# Patient Record
Sex: FEMALE | Race: WHITE | NOT HISPANIC OR LATINO | Employment: UNEMPLOYED | URBAN - METROPOLITAN AREA
[De-identification: names, ages, dates, MRNs, and addresses within clinical notes are randomized per-mention and may not be internally consistent; named-entity substitution may affect disease eponyms.]

---

## 2019-07-11 ENCOUNTER — OFFICE VISIT (OUTPATIENT)
Dept: URGENT CARE | Facility: CLINIC | Age: 24
End: 2019-07-11
Payer: OTHER GOVERNMENT

## 2019-07-11 VITALS
SYSTOLIC BLOOD PRESSURE: 110 MMHG | DIASTOLIC BLOOD PRESSURE: 70 MMHG | WEIGHT: 129 LBS | TEMPERATURE: 98.9 F | BODY MASS INDEX: 23.74 KG/M2 | OXYGEN SATURATION: 98 % | HEIGHT: 62 IN | RESPIRATION RATE: 16 BRPM | HEART RATE: 98 BPM

## 2019-07-11 DIAGNOSIS — L08.9 SOFT TISSUE INFECTION: ICD-10-CM

## 2019-07-11 PROCEDURE — 99203 OFFICE O/P NEW LOW 30 MIN: CPT | Performed by: FAMILY MEDICINE

## 2019-07-11 RX ORDER — SULFAMETHOXAZOLE AND TRIMETHOPRIM 800; 160 MG/1; MG/1
1 TABLET ORAL EVERY 12 HOURS
Qty: 20 TAB | Refills: 0 | Status: SHIPPED | OUTPATIENT
Start: 2019-07-11 | End: 2019-07-21

## 2019-07-11 ASSESSMENT — ENCOUNTER SYMPTOMS
EYE DISCHARGE: 0
SENSORY CHANGE: 0
CHILLS: 0
FEVER: 0
EYE REDNESS: 0
FOCAL WEAKNESS: 0

## 2019-07-11 NOTE — PROGRESS NOTES
"Subjective:      Mikala Fernández is a 23 y.o. female who presents with Nodule (x 1 wk, Bump on Rt. upper leg, another one on Rt. armpit and another one under Lt. arm.  Headaches and nausea. )            1 week multiple red painful bumps right groin and bilateral axilla. No drainage. No fever. +nausea and fatigue. Doesn't suspect pregnancy, just finished normal menses. No PMH same. No OTC or home remedies. No other aggravating or alleviating factors.          Review of Systems   Constitutional: Negative for chills and fever.   Eyes: Negative for discharge and redness.   Skin: Negative for itching and rash.   Neurological: Negative for sensory change and focal weakness.   .  Medications, Allergies, and current problem list reviewed today in Epic         Objective:     /70 (BP Location: Left arm, Patient Position: Sitting, BP Cuff Size: Adult)   Pulse 98   Temp 37.2 °C (98.9 °F) (Temporal)   Resp 16   Ht 1.575 m (5' 2\")   Wt 58.5 kg (129 lb)   SpO2 98%   BMI 23.59 kg/m²      Physical Exam   Constitutional: She is oriented to person, place, and time. She appears well-developed and well-nourished.   HENT:   Head: Normocephalic and atraumatic.   Eyes: Conjunctivae are normal.   Cardiovascular: Normal rate, regular rhythm and normal heart sounds.    Pulmonary/Chest: Effort normal and breath sounds normal.   Lymphadenopathy:     She has no cervical adenopathy.   Neurological: She is alert and oriented to person, place, and time.   Skin: Skin is warm and dry.   Red tender and circumscribed masses bilateral axilla and right-sided groin.  No fluctuance.  No expanding redness.  No lymphangitis.  No obvious lymphadenopathy.  No other aggravating or alleviating factors.               Assessment/Plan:     1. Soft tissue infection  sulfamethoxazole-trimethoprim (BACTRIM DS) 800-160 MG tablet     Differential diagnosis, natural history, supportive care, and indications for immediate follow-up discussed at length. "     Nothing to clearly drain today.  She understands to come back if these come to ahead.  Of note the groin swelling is less red but does still appear to be cutaneous.    Differential diagnosis includes hidradenitis suppurativa.  If recurrent would recommend dermatology referral.

## 2019-07-17 ENCOUNTER — OFFICE VISIT (OUTPATIENT)
Dept: URGENT CARE | Facility: PHYSICIAN GROUP | Age: 24
End: 2019-07-17
Payer: OTHER GOVERNMENT

## 2019-07-17 VITALS
TEMPERATURE: 99.7 F | OXYGEN SATURATION: 98 % | HEIGHT: 62 IN | SYSTOLIC BLOOD PRESSURE: 108 MMHG | WEIGHT: 130 LBS | BODY MASS INDEX: 23.92 KG/M2 | HEART RATE: 103 BPM | DIASTOLIC BLOOD PRESSURE: 62 MMHG

## 2019-07-17 DIAGNOSIS — S61.411A LACERATION OF RIGHT HAND, INITIAL ENCOUNTER: Primary | ICD-10-CM

## 2019-07-17 PROCEDURE — 12001 RPR S/N/AX/GEN/TRNK 2.5CM/<: CPT | Performed by: NURSE PRACTITIONER

## 2019-07-17 ASSESSMENT — ENCOUNTER SYMPTOMS
CONSTITUTIONAL NEGATIVE: 1
SENSORY CHANGE: 1
FOCAL WEAKNESS: 0
RESPIRATORY NEGATIVE: 1

## 2019-07-18 ENCOUNTER — OFFICE VISIT (OUTPATIENT)
Dept: URGENT CARE | Facility: CLINIC | Age: 24
End: 2019-07-18
Payer: OTHER GOVERNMENT

## 2019-07-18 VITALS
OXYGEN SATURATION: 98 % | HEIGHT: 62 IN | HEART RATE: 70 BPM | TEMPERATURE: 98.7 F | RESPIRATION RATE: 16 BRPM | DIASTOLIC BLOOD PRESSURE: 68 MMHG | WEIGHT: 130 LBS | SYSTOLIC BLOOD PRESSURE: 106 MMHG | BODY MASS INDEX: 23.92 KG/M2

## 2019-07-18 DIAGNOSIS — S61.210D LACERATION OF RIGHT INDEX FINGER WITHOUT FOREIGN BODY WITHOUT DAMAGE TO NAIL, SUBSEQUENT ENCOUNTER: ICD-10-CM

## 2019-07-18 DIAGNOSIS — S61.401A EXTENSOR TENDON LACERATION OF HAND WITH OPEN WOUND, RIGHT, INITIAL ENCOUNTER: ICD-10-CM

## 2019-07-18 DIAGNOSIS — S66.821A EXTENSOR TENDON LACERATION OF HAND WITH OPEN WOUND, RIGHT, INITIAL ENCOUNTER: ICD-10-CM

## 2019-07-18 PROCEDURE — 99024 POSTOP FOLLOW-UP VISIT: CPT | Performed by: FAMILY MEDICINE

## 2019-07-18 RX ORDER — HYDROCODONE BITARTRATE AND ACETAMINOPHEN 5; 325 MG/1; MG/1
1-2 TABLET ORAL EVERY 8 HOURS PRN
Qty: 10 TAB | Refills: 0 | Status: SHIPPED | OUTPATIENT
Start: 2019-07-18 | End: 2019-07-23

## 2019-07-18 NOTE — PROGRESS NOTES
"Subjective:     Mikala Huitron is a 23 y.o. female who presents for Trauma (Right hand - Glass bowl broke and slit right pointer knuckle. )       Laceration    Her tetanus status is UTD.     Pt reports that about 1 hour PTA she was cleaning dishes when a glass bowl broke and accidentally cut the skin above her knuckles of her right hand. She has a laceration at the dorsal base of her R 2nd and 3rd fingers. Reports some decreased sensation, pain, swelling, and decreased ROM. Pt reports last Tdap 2 years ago.    PMH:  has no past medical history on file.    MEDS:   Current Outpatient Prescriptions:   •  sulfamethoxazole-trimethoprim (BACTRIM DS) 800-160 MG tablet, Take 1 Tab by mouth every 12 hours for 10 days., Disp: 20 Tab, Rfl: 0    ALLERGIES:   Allergies   Allergen Reactions   • Methimazole Swelling     Throat swalling     SURGHX: No past surgical history on file.    SOCHX:  reports that she has never smoked. She has never used smokeless tobacco. She reports that she does not drink alcohol or use drugs.     FH: Reviewed with patient, not pertinent to this visit.    Review of Systems   Constitutional: Negative.    Respiratory: Negative.    Musculoskeletal:        Pain, swelling, decreased ROM of 2nd and 3rd fingers of R hand   Skin:        Laceration of R hand at knuckles of 2nd and 3rd fingers   Neurological: Positive for sensory change. Negative for focal weakness.   All other systems reviewed and are negative.    Objective:     /62 (BP Location: Left arm, Patient Position: Sitting, BP Cuff Size: Adult)   Pulse (!) 103   Temp 37.6 °C (99.7 °F) (Temporal)   Ht 1.575 m (5' 2\")   Wt 59 kg (130 lb)   SpO2 98%   BMI 23.78 kg/m²     Physical Exam   Constitutional: She is oriented to person, place, and time. She appears well-developed and well-nourished.  Non-toxic appearance. No distress.   HENT:   Right Ear: External ear normal.   Left Ear: External ear normal.   Nose: Nose normal.   Eyes: Conjunctivae " and EOM are normal.   Neck: Normal range of motion.   Cardiovascular: Normal pulses.  Tachycardia present.    Pulmonary/Chest: Effort normal. No respiratory distress.   Musculoskeletal: She exhibits no deformity.        Right hand: She exhibits decreased range of motion, laceration and swelling. She exhibits normal capillary refill and no deformity. Decreased sensation noted.        Hands:  Neurological: She is alert and oriented to person, place, and time. Coordination normal.   Skin: Skin is warm and dry. Capillary refill takes less than 2 seconds.   Psychiatric: She has a normal mood and affect. Her behavior is normal.        Assessment/Plan:     1. Laceration of left hand, initial encounter    Procedure: Laceration Repair of Right Hand  - Risks, benefits, and alternatives reviewed. Risks including bleeding, nerve damage, infection, and poor cosmetic outcome discussed at length.  - Verbal consent was received from patient to proceed with laceration repair.  - Wound length 2.5 cm, location left hand, straight laceration, subcutaneous tissue visible, NVI, no signs of tendon injury.  - Area copiously irrigated with NS, wound explored, no foreign bodies identified.  - Site prepared with Betadine.  - Sterile technique throughout.  - Local anesthesia achieved with 1.5 mL of 2% lidocaine without epinephrine.  - Applied 7 sutures with 4.0 Ethilon interrupted sutures with good wound edge approximation.  - Irrigated copiously with NS.  - Minimal bleeding with good hemostasis achieved.   - Antibiotic ointment and non-adhesive dressing applied.  - There were no procedural complications.  - Patient tolerated procedure well.    - Last tetanus booster was 2 years ago.     Patient advised to monitor for signs of worsening infection including, but not limited to, increased redness, warmth, pain, swelling, discharge, or fever. Wound care instructions discussed. Advised to protect from further injury. May apply ice packs, elevate,  and take OTC ibuprofen and/or acetaminophen for pain. Return for suture removal in 10 days.    Patient currently on Bactrim DS BID for 5 more days for pre-existing soft tissue infection.    Finger splint applied to protect laceration, but patient encouraged to occasionally remove splint and exercise gentle ROM.    Patient advised to: Return for 1) Symptoms don't improve or worsen, or go to ER.    Differential diagnosis, natural history, supportive care, and indications for immediate follow-up discussed. All questions answered. Patient agrees with the plan of care.

## 2019-07-18 NOTE — PATIENT INSTRUCTIONS
Laceration Care, Adult  A laceration is a cut that goes through all of the layers of the skin and into the tissue that is right under the skin. Some lacerations heal on their own. Others need to be closed with stitches (sutures), staples, skin adhesive strips, or skin glue. Proper laceration care minimizes the risk of infection and helps the laceration to heal better.  HOW TO CARE FOR YOUR LACERATION  If sutures or staples were used:  · Keep the wound clean and dry.  · If you were given a bandage (dressing), you should change it at least one time per day or as told by your health care provider. You should also change it if it becomes wet or dirty.  · Keep the wound completely dry for the first 24 hours or as told by your health care provider. After that time, you may shower or bathe. However, make sure that the wound is not soaked in water until after the sutures or staples have been removed.  · Clean the wound one time each day or as told by your health care provider:  ¨ Wash the wound with soap and water.  ¨ Rinse the wound with water to remove all soap.  ¨ Pat the wound dry with a clean towel. Do not rub the wound.  · After cleaning the wound, apply a thin layer of antibiotic ointment as told by your health care provider. This will help to prevent infection and keep the dressing from sticking to the wound.  · Have the sutures or staples removed as told by your health care provider.  If skin adhesive strips were used:  · Keep the wound clean and dry.  · If you were given a bandage (dressing), you should change it at least one time per day or as told by your health care provider. You should also change it if it becomes dirty or wet.  · Do not get the skin adhesive strips wet. You may shower or bathe, but be careful to keep the wound dry.  · If the wound gets wet, pat it dry with a clean towel. Do not rub the wound.  · Skin adhesive strips fall off on their own. You may trim the strips as the wound heals. Do not  remove skin adhesive strips that are still stuck to the wound. They will fall off in time.  If skin glue was used:  · Try to keep the wound dry, but you may briefly wet it in the shower or bath. Do not soak the wound in water, such as by swimming.  · After you have showered or bathed, gently pat the wound dry with a clean towel. Do not rub the wound.  · Do not do any activities that will make you sweat heavily until the skin glue has fallen off on its own.  · Do not apply liquid, cream, or ointment medicine to the wound while the skin glue is in place. Using those may loosen the film before the wound has healed.  · If you were given a bandage (dressing), you should change it at least one time per day or as told by your health care provider. You should also change it if it becomes dirty or wet.  · If a dressing is placed over the wound, be careful not to apply tape directly over the skin glue. Doing that may cause the glue to be pulled off before the wound has healed.  · Do not pick at the glue. The skin glue usually remains in place for 5-10 days, then it falls off of the skin.  General Instructions  · Take over-the-counter and prescription medicines only as told by your health care provider.  · If you were prescribed an antibiotic medicine or ointment, take or apply it as told by your doctor. Do not stop using it even if your condition improves.  · To help prevent scarring, make sure to cover your wound with sunscreen whenever you are outside after stitches are removed, after adhesive strips are removed, or when glue remains in place and the wound is healed. Make sure to wear a sunscreen of at least 30 SPF.  · Do not scratch or pick at the wound.  · Keep all follow-up visits as told by your health care provider. This is important.  · Check your wound every day for signs of infection. Watch for:  ¨ Redness, swelling, or pain.  ¨ Fluid, blood, or pus.  · Raise (elevate) the injured area above the level of your heart  while you are sitting or lying down, if possible.  SEEK MEDICAL CARE IF:  · You received a tetanus shot and you have swelling, severe pain, redness, or bleeding at the injection site.  · You have a fever.  · A wound that was closed breaks open.  · You notice a bad smell coming from your wound or your dressing.  · You notice something coming out of the wound, such as wood or glass.  · Your pain is not controlled with medicine.  · You have increased redness, swelling, or pain at the site of your wound.  · You have fluid, blood, or pus coming from your wound.  · You notice a change in the color of your skin near your wound.  · You need to change the dressing frequently due to fluid, blood, or pus draining from the wound.  · You develop a new rash.  · You develop numbness around the wound.  SEEK IMMEDIATE MEDICAL CARE IF:  · You develop severe swelling around the wound.  · Your pain suddenly increases and is severe.  · You develop painful lumps near the wound or on skin that is anywhere on your body.  · You have a red streak going away from your wound.  · The wound is on your hand or foot and you cannot properly move a finger or toe.  · The wound is on your hand or foot and you notice that your fingers or toes look pale or bluish.     This information is not intended to replace advice given to you by your health care provider. Make sure you discuss any questions you have with your health care provider.     Document Released: 12/18/2006 Document Revised: 05/03/2016 Document Reviewed: 12/14/2015  [employer] Interactive Patient Education ©2016 [employer]

## 2019-07-19 NOTE — PROGRESS NOTES
Patient seen last night for laceration over the dorsal aspect of her second right MCP joint.  She is noticed significant swelling and bruising in the finger.  She is unable to actively extend her finger.  Pain was severe last night and not controlled with over-the-counter medication.  She is right-hand dominant      Right hand: Linear laceration dorsum second MCP joint is well approximated, clean dry and intact.  She is unable to extend her finger.    A/P    1. Extensor tendon laceration of hand with open wound, right, initial encounter  REFERRAL TO HAND SURGERY    HYDROcodone-acetaminophen (NORCO) 5-325 MG Tab per tablet    Consent for Opiate Prescription   2. Laceration of right index finger without foreign body without damage to nail, subsequent encounter  Consent for Opiate Prescription     Differential diagnosis, natural history, supportive care, and indications for immediate follow-up discussed at length.     Referral to hand surgery has been placed.  I will call in the morning to make sure she gets follow-up.

## 2019-07-22 ENCOUNTER — HOSPITAL ENCOUNTER (EMERGENCY)
Facility: MEDICAL CENTER | Age: 24
End: 2019-07-22
Attending: EMERGENCY MEDICINE
Payer: OTHER GOVERNMENT

## 2019-07-22 VITALS
OXYGEN SATURATION: 98 % | BODY MASS INDEX: 23.57 KG/M2 | HEIGHT: 62 IN | RESPIRATION RATE: 16 BRPM | HEART RATE: 89 BPM | WEIGHT: 128.09 LBS | DIASTOLIC BLOOD PRESSURE: 74 MMHG | SYSTOLIC BLOOD PRESSURE: 110 MMHG | TEMPERATURE: 98.3 F

## 2019-07-22 DIAGNOSIS — L27.0 DRUG RASH: ICD-10-CM

## 2019-07-22 PROCEDURE — 700102 HCHG RX REV CODE 250 W/ 637 OVERRIDE(OP): Performed by: EMERGENCY MEDICINE

## 2019-07-22 PROCEDURE — 99283 EMERGENCY DEPT VISIT LOW MDM: CPT

## 2019-07-22 PROCEDURE — A9270 NON-COVERED ITEM OR SERVICE: HCPCS | Performed by: EMERGENCY MEDICINE

## 2019-07-22 RX ORDER — METHYLPREDNISOLONE 4 MG/1
TABLET ORAL
Qty: 1 KIT | Refills: 0 | Status: SHIPPED | OUTPATIENT
Start: 2019-07-22 | End: 2020-11-09

## 2019-07-22 RX ORDER — DIPHENHYDRAMINE HCL 25 MG
25 TABLET ORAL ONCE
Status: COMPLETED | OUTPATIENT
Start: 2019-07-22 | End: 2019-07-22

## 2019-07-22 RX ORDER — DEXAMETHASONE 4 MG/1
8 TABLET ORAL ONCE
Status: COMPLETED | OUTPATIENT
Start: 2019-07-22 | End: 2019-07-22

## 2019-07-22 RX ADMIN — DIPHENHYDRAMINE HCL 25 MG: 25 TABLET ORAL at 09:55

## 2019-07-22 RX ADMIN — DEXAMETHASONE 8 MG: 4 TABLET ORAL at 09:55

## 2019-07-22 NOTE — ED TRIAGE NOTES
Chief Complaint   Patient presents with   • Hives     Hives over trunk, arms, and legs.  States has mild difficulty swallowing.  Managing secretions.  No SOB.  Pt recently started taking bactrim.  Triage process explained to patient.  Pt back to waiting room.  Pt instructed to inform RN if any changes or questions arise.

## 2019-07-22 NOTE — ED PROVIDER NOTES
"ED Provider Note    CHIEF COMPLAINT  Chief Complaint   Patient presents with   • Hives       HPI  Mikala Huitron is a 23 y.o. female who presents for evaluation of 3 to 4 days of red raised pruritic rash.  The patient was recently prescribed Bactrim for an apparent lymphadenitis but then also had a tendon injury to the finger.  She is around 7 to 10 days and course.  She has no known history of drug or food allergies.  She denies any significant trouble breathing but reports a tickle in the back of her throat.  No other symptoms such as night sweats weight loss.  She denies pregnancy.  No other symptoms reported    REVIEW OF SYSTEMS  See HPI for further details.  No night sweats weight loss numbness tingling weakness rash all other systems are negative.     PAST MEDICAL HISTORY  No past medical history on file.  None reported  FAMILY HISTORY  Noncontributory    SOCIAL HISTORY  Social History     Social History   • Marital status: Single     Spouse name: N/A   • Number of children: N/A   • Years of education: N/A     Social History Main Topics   • Smoking status: Never Smoker   • Smokeless tobacco: Never Used   • Alcohol use No   • Drug use: No   • Sexual activity: Not on file     Other Topics Concern   • Not on file     Social History Narrative   • No narrative on file     Denies IV drug  SURGICAL HISTORY  No past surgical history on file.  No major surgery  CURRENT MEDICATIONS  No current facility-administered medications for this encounter.     Current Outpatient Prescriptions:   •  HYDROcodone-acetaminophen (NORCO) 5-325 MG Tab per tablet, Take 1-2 Tabs by mouth every 8 hours as needed for up to 5 days., Disp: 10 Tab, Rfl: 0      ALLERGIES  Allergies   Allergen Reactions   • Methimazole Swelling     Throat swalling       PHYSICAL EXAM  VITAL SIGNS: /70   Pulse (!) 105   Temp 36.8 °C (98.3 °F) (Temporal)   Resp 14   Ht 1.575 m (5' 2\")   Wt 58.1 kg (128 lb 1.4 oz)   SpO2 97%   BMI 23.43 kg/m²   "     Constitutional: Well developed, Well nourished, No acute distress, Non-toxic appearance.   HENT: Normocephalic, Atraumatic, Bilateral external ears normal, Oropharynx moist, No oral exudates, Nose normal.   Eyes: PERRLA, EOMI, Conjunctiva normal, No discharge.   Neck: Normal range of motion, No tenderness, Supple, No stridor.   Cardiovascular: Normal heart rate, Normal rhythm, No murmurs, No rubs, No gallops.   Thorax & Lungs: Normal breath sounds, No respiratory distress, No wheezing, No chest tenderness.   Abdomen: Bowel sounds normal, Soft, No tenderness, No masses, No pulsatile masses.   Skin: Some red raised blanchable rash, discrete papules no petechiae no purpura  Back: No tenderness, No CVA tenderness.   Extremities: Intact distal pulses, No edema, No tenderness, No cyanosis, No clubbing.   Musculoskeletal: Left hand exam is notable for transverse laceration on the dorsal aspect of the left index finger with a healing surgical wound   neurologic: Alert & oriented x 3, Normal motor function, Normal sensory function, No focal deficits noted.   Psychiatric: Anxious      COURSE & MEDICAL DECISION MAKING  Pertinent Labs & Imaging studies reviewed. (See chart for details)  Patient was given oral Benadryl and Decadron.  Her rash was improved.  This is most consistent with a Bactrim drug rash rather than anaphylaxis or true hives.  She is Ponce done with her antibiotic therapy and there is no clear indicati better gets around on for continued use.  I counseled her that she likely is allergic to sulfa-based antibiotics.  I will place her on a Medrol Dosepak and she also needs referral to hand surgery for ongoing management of tendon injury    FINAL IMPRESSION  1.   1. Drug rash             Electronically signed by: Reji Trotter, 7/22/2019 9:46 AM

## 2019-12-30 ENCOUNTER — APPOINTMENT (OUTPATIENT)
Dept: RADIOLOGY | Facility: MEDICAL CENTER | Age: 24
End: 2019-12-30
Attending: EMERGENCY MEDICINE
Payer: OTHER GOVERNMENT

## 2019-12-30 ENCOUNTER — HOSPITAL ENCOUNTER (EMERGENCY)
Facility: MEDICAL CENTER | Age: 24
End: 2019-12-30
Attending: EMERGENCY MEDICINE
Payer: OTHER GOVERNMENT

## 2019-12-30 VITALS
BODY MASS INDEX: 22.96 KG/M2 | HEIGHT: 62 IN | HEART RATE: 90 BPM | OXYGEN SATURATION: 99 % | TEMPERATURE: 98 F | WEIGHT: 124.78 LBS | DIASTOLIC BLOOD PRESSURE: 57 MMHG | RESPIRATION RATE: 18 BRPM | SYSTOLIC BLOOD PRESSURE: 97 MMHG

## 2019-12-30 DIAGNOSIS — K52.9 COLITIS: ICD-10-CM

## 2019-12-30 DIAGNOSIS — R10.9 ABDOMINAL PAIN, UNSPECIFIED ABDOMINAL LOCATION: ICD-10-CM

## 2019-12-30 LAB
ALBUMIN SERPL BCP-MCNC: 4.2 G/DL (ref 3.2–4.9)
ALBUMIN/GLOB SERPL: 1.6 G/DL
ALP SERPL-CCNC: 37 U/L (ref 30–99)
ALT SERPL-CCNC: 13 U/L (ref 2–50)
ANION GAP SERPL CALC-SCNC: 8 MMOL/L (ref 0–11.9)
APPEARANCE UR: CLEAR
AST SERPL-CCNC: 18 U/L (ref 12–45)
BACTERIA #/AREA URNS HPF: ABNORMAL /HPF
BASOPHILS # BLD AUTO: 0.4 % (ref 0–1.8)
BASOPHILS # BLD: 0.02 K/UL (ref 0–0.12)
BILIRUB SERPL-MCNC: 0.7 MG/DL (ref 0.1–1.5)
BILIRUB UR QL STRIP.AUTO: NEGATIVE
BUN SERPL-MCNC: 13 MG/DL (ref 8–22)
CALCIUM SERPL-MCNC: 9 MG/DL (ref 8.5–10.5)
CHLORIDE SERPL-SCNC: 106 MMOL/L (ref 96–112)
CO2 SERPL-SCNC: 25 MMOL/L (ref 20–33)
COLOR UR: YELLOW
CREAT SERPL-MCNC: 0.68 MG/DL (ref 0.5–1.4)
EOSINOPHIL # BLD AUTO: 0.1 K/UL (ref 0–0.51)
EOSINOPHIL NFR BLD: 1.8 % (ref 0–6.9)
EPI CELLS #/AREA URNS HPF: ABNORMAL /HPF
ERYTHROCYTE [DISTWIDTH] IN BLOOD BY AUTOMATED COUNT: 42.1 FL (ref 35.9–50)
GLOBULIN SER CALC-MCNC: 2.7 G/DL (ref 1.9–3.5)
GLUCOSE SERPL-MCNC: 79 MG/DL (ref 65–99)
GLUCOSE UR STRIP.AUTO-MCNC: NEGATIVE MG/DL
HCG SERPL QL: NEGATIVE
HCT VFR BLD AUTO: 41.1 % (ref 37–47)
HGB BLD-MCNC: 13.3 G/DL (ref 12–16)
HYALINE CASTS #/AREA URNS LPF: ABNORMAL /LPF
IMM GRANULOCYTES # BLD AUTO: 0.02 K/UL (ref 0–0.11)
IMM GRANULOCYTES NFR BLD AUTO: 0.4 % (ref 0–0.9)
KETONES UR STRIP.AUTO-MCNC: NEGATIVE MG/DL
LEUKOCYTE ESTERASE UR QL STRIP.AUTO: ABNORMAL
LYMPHOCYTES # BLD AUTO: 2.03 K/UL (ref 1–4.8)
LYMPHOCYTES NFR BLD: 35.6 % (ref 22–41)
MCH RBC QN AUTO: 31 PG (ref 27–33)
MCHC RBC AUTO-ENTMCNC: 32.4 G/DL (ref 33.6–35)
MCV RBC AUTO: 95.8 FL (ref 81.4–97.8)
MICRO URNS: ABNORMAL
MONOCYTES # BLD AUTO: 0.48 K/UL (ref 0–0.85)
MONOCYTES NFR BLD AUTO: 8.4 % (ref 0–13.4)
NEUTROPHILS # BLD AUTO: 3.05 K/UL (ref 2–7.15)
NEUTROPHILS NFR BLD: 53.4 % (ref 44–72)
NITRITE UR QL STRIP.AUTO: NEGATIVE
NRBC # BLD AUTO: 0 K/UL
NRBC BLD-RTO: 0 /100 WBC
PH UR STRIP.AUTO: 5 [PH] (ref 5–8)
PLATELET # BLD AUTO: 229 K/UL (ref 164–446)
PMV BLD AUTO: 9.5 FL (ref 9–12.9)
POTASSIUM SERPL-SCNC: 3.9 MMOL/L (ref 3.6–5.5)
PROT SERPL-MCNC: 6.9 G/DL (ref 6–8.2)
PROT UR QL STRIP: NEGATIVE MG/DL
RBC # BLD AUTO: 4.29 M/UL (ref 4.2–5.4)
RBC # URNS HPF: ABNORMAL /HPF
RBC UR QL AUTO: NEGATIVE
SODIUM SERPL-SCNC: 139 MMOL/L (ref 135–145)
SP GR UR STRIP.AUTO: 1.02
UROBILINOGEN UR STRIP.AUTO-MCNC: 0.2 MG/DL
WBC # BLD AUTO: 5.7 K/UL (ref 4.8–10.8)
WBC #/AREA URNS HPF: ABNORMAL /HPF

## 2019-12-30 PROCEDURE — 74177 CT ABD & PELVIS W/CONTRAST: CPT

## 2019-12-30 PROCEDURE — 85025 COMPLETE CBC W/AUTO DIFF WBC: CPT

## 2019-12-30 PROCEDURE — 80053 COMPREHEN METABOLIC PANEL: CPT

## 2019-12-30 PROCEDURE — 96375 TX/PRO/DX INJ NEW DRUG ADDON: CPT

## 2019-12-30 PROCEDURE — 94760 N-INVAS EAR/PLS OXIMETRY 1: CPT

## 2019-12-30 PROCEDURE — 700117 HCHG RX CONTRAST REV CODE 255: Performed by: EMERGENCY MEDICINE

## 2019-12-30 PROCEDURE — 84703 CHORIONIC GONADOTROPIN ASSAY: CPT

## 2019-12-30 PROCEDURE — 96374 THER/PROPH/DIAG INJ IV PUSH: CPT

## 2019-12-30 PROCEDURE — 700111 HCHG RX REV CODE 636 W/ 250 OVERRIDE (IP): Performed by: EMERGENCY MEDICINE

## 2019-12-30 PROCEDURE — 81001 URINALYSIS AUTO W/SCOPE: CPT

## 2019-12-30 PROCEDURE — 87086 URINE CULTURE/COLONY COUNT: CPT

## 2019-12-30 PROCEDURE — 99285 EMERGENCY DEPT VISIT HI MDM: CPT

## 2019-12-30 RX ORDER — AMOXICILLIN AND CLAVULANATE POTASSIUM 875; 125 MG/1; MG/1
1 TABLET, FILM COATED ORAL 2 TIMES DAILY
Qty: 20 TAB | Refills: 0 | Status: SHIPPED | OUTPATIENT
Start: 2019-12-30 | End: 2020-01-09

## 2019-12-30 RX ORDER — ONDANSETRON 2 MG/ML
4 INJECTION INTRAMUSCULAR; INTRAVENOUS ONCE
Status: COMPLETED | OUTPATIENT
Start: 2019-12-30 | End: 2019-12-30

## 2019-12-30 RX ORDER — KETOROLAC TROMETHAMINE 30 MG/ML
30 INJECTION, SOLUTION INTRAMUSCULAR; INTRAVENOUS ONCE
Status: COMPLETED | OUTPATIENT
Start: 2019-12-30 | End: 2019-12-30

## 2019-12-30 RX ADMIN — IOHEXOL 100 ML: 350 INJECTION, SOLUTION INTRAVENOUS at 13:22

## 2019-12-30 RX ADMIN — KETOROLAC TROMETHAMINE 30 MG: 30 INJECTION, SOLUTION INTRAMUSCULAR; INTRAVENOUS at 12:31

## 2019-12-30 RX ADMIN — ONDANSETRON 4 MG: 2 INJECTION INTRAMUSCULAR; INTRAVENOUS at 12:31

## 2019-12-30 NOTE — ED TRIAGE NOTES
Chief Complaint   Patient presents with   • Abdominal Cramping     all day yesterday. today having stabbing pain in rlq and radiates to back.   • Diarrhea     yesterday had 6 episodes.   • Urinary Frequency     since yesterday. and feels like having uti symptoms.     States already had her right fallopian tube removed. Educated on triage process.instructed to notify staff for any worsening symptoms. NAD.

## 2019-12-30 NOTE — ED NOTES
Pt has been updated on poc.  Pain has improved.  3/10     advised patient to return to ED or call 911 if he develops suicidal or homicidal ideation.

## 2019-12-30 NOTE — DISCHARGE INSTRUCTIONS
Follow-up with primary care as well as your GI physician when you return to [place] for reevaluation and further work-up if indicated or for persistent symptoms.    Augmentin twice daily for 10 days.    Encourage oral fluid hydration.  Clear liquid diet for 48 hours, then advance to bland foods as tolerated.    Return to the emergency department for persistent or worsening abdominal pain, fever, vomiting, bloody stools or other new concerns.

## 2019-12-30 NOTE — ED PROVIDER NOTES
ED Provider Note    CHIEF COMPLAINT  Chief Complaint   Patient presents with   • Abdominal Cramping     all day yesterday. today having stabbing pain in rlq and radiates to back.   • Diarrhea     yesterday had 6 episodes.   • Urinary Frequency     since yesterday. and feels like having uti symptoms.       HPI  Mikala Huitron is a 24 y.o. female who presents to the emergency department through triage for abdominal pain.  Patient describes some generalized abdominal cramping yesterday, now more in the lower abdomen, right lower quadrant.  Persistent for 24 hours, currently 5 out of 10 constant, aching, cramping discomfort.  No nausea or vomiting.  Patient did have 6 episodes of nonbloody or mucoid diarrhea yesterday however none today.  Tolerating food and fluids without difficulty but did not eat or drink this morning for recommendations from advice nurse before presenting to the emergency department.  Patient denies dysuria, hematuria urgency.  Patient describes some urinary frequency, but states she is been drinking more water.  Midcycle, denies similar symptoms with premenstrual time or ovulation previously.  Denies pregnancy.  Denies abnormal vaginal discharge or concern for STD.  Patient is ,  is deployed.    History of ectopic pregnancy with salpingectomy 1 year ago.  History of ovarian cyst, although small and believe that that had resolved.    REVIEW OF SYSTEMS  See HPI for further details. All other systems are negative.     PAST MEDICAL HISTORY   has a past medical history of Ectopic pregnancy.    SOCIAL HISTORY  Social History     Tobacco Use   • Smoking status: Never Smoker   • Smokeless tobacco: Never Used   Substance and Sexual Activity   • Alcohol use: No   • Drug use: No   • Sexual activity: Not on file       SURGICAL HISTORY  patient denies any surgical history    CURRENT MEDICATIONS  Home Medications     Reviewed by Shelia Carrion R.N. (Registered Nurse) on 12/30/19 at [place]  [facility]  "Status: Complete   Medication Last Dose Status   methylPREDNISolone (MEDROL DOSEPAK) 4 MG Tablet Therapy Pack not taking Active                ALLERGIES  Allergies   Allergen Reactions   • Methimazole Swelling     Throat swalling       PHYSICAL EXAM  VITAL SIGNS: BP (!) 97/57   Pulse 90   Temp 36.7 °C (98 °F) (Oral)   Resp 18   Ht 1.575 m (5' 2\")   Wt 56.6 kg (124 lb 12.5 oz)   LMP 12/14/2019   SpO2 99%   Breastfeeding? Unknown   BMI 22.82 kg/m²   Pulse ox interpretation: I interpret this pulse ox as normal.  Constitutional: Alert in no apparent distress.  HENT: Normocephalic, atraumatic. Bilateral external ears normal, Nose normal. Moist mucous membranes.    Eyes: Pupils are equal and reactive, Conjunctiva normal.   Neck: Normal range of motion, Supple  Lymphatic: No lymphadenopathy noted.   Cardiovascular: Regular rate and rhythm, no murmurs. Distal pulses intact.    Thorax & Lungs: Normal breath sounds.  No wheezing/rales/ronchi. No increased work of breathing  Abdomen: Soft, non-distended.  Minimal reproducible discomfort in the suprapubic and right lower quadrant.  No localized McBurney tenderness.  No guarding or peritonitis.  No CVA tenderness percussion.  Skin: Warm, Dry, No erythema, No rash.   Musculoskeletal: Good range of motion in all major joints.  Neurologic: Alert and oriented x4.  Ambulates independently.  Psychiatric: Affect normal, Judgment normal, Mood normal.       DIAGNOSTIC STUDIES / PROCEDURES    LABS  Results for orders placed or performed during the hospital encounter of 12/30/19   CBC WITH DIFFERENTIAL   Result Value Ref Range    WBC 5.7 4.8 - 10.8 K/uL    RBC 4.29 4.20 - 5.40 M/uL    Hemoglobin 13.3 12.0 - 16.0 g/dL    Hematocrit 41.1 37.0 - 47.0 %    MCV 95.8 81.4 - 97.8 fL    MCH 31.0 27.0 - 33.0 pg    MCHC 32.4 (L) 33.6 - 35.0 g/dL    RDW 42.1 35.9 - 50.0 fL    Platelet Count 229 164 - 446 K/uL    MPV 9.5 9.0 - 12.9 fL    Neutrophils-Polys 53.40 44.00 - 72.00 %    Lymphocytes " 35.60 22.00 - 41.00 %    Monocytes 8.40 0.00 - 13.40 %    Eosinophils 1.80 0.00 - 6.90 %    Basophils 0.40 0.00 - 1.80 %    Immature Granulocytes 0.40 0.00 - 0.90 %    Nucleated RBC 0.00 /100 WBC    Neutrophils (Absolute) 3.05 2.00 - 7.15 K/uL    Lymphs (Absolute) 2.03 1.00 - 4.80 K/uL    Monos (Absolute) 0.48 0.00 - 0.85 K/uL    Eos (Absolute) 0.10 0.00 - 0.51 K/uL    Baso (Absolute) 0.02 0.00 - 0.12 K/uL    Immature Granulocytes (abs) 0.02 0.00 - 0.11 K/uL    NRBC (Absolute) 0.00 K/uL   COMP METABOLIC PANEL   Result Value Ref Range    Sodium 139 135 - 145 mmol/L    Potassium 3.9 3.6 - 5.5 mmol/L    Chloride 106 96 - 112 mmol/L    Co2 25 20 - 33 mmol/L    Anion Gap 8.0 0.0 - 11.9    Glucose 79 65 - 99 mg/dL    Bun 13 8 - 22 mg/dL    Creatinine 0.68 0.50 - 1.40 mg/dL    Calcium 9.0 8.5 - 10.5 mg/dL    AST(SGOT) 18 12 - 45 U/L    ALT(SGPT) 13 2 - 50 U/L    Alkaline Phosphatase 37 30 - 99 U/L    Total Bilirubin 0.7 0.1 - 1.5 mg/dL    Albumin 4.2 3.2 - 4.9 g/dL    Total Protein 6.9 6.0 - 8.2 g/dL    Globulin 2.7 1.9 - 3.5 g/dL    A-G Ratio 1.6 g/dL   URINALYSIS CULTURE, IF INDICATED   Result Value Ref Range    Color Yellow     Character Clear     Specific Gravity 1.019 <1.035    Ph 5.0 5.0 - 8.0    Glucose Negative Negative mg/dL    Ketones Negative Negative mg/dL    Protein Negative Negative mg/dL    Bilirubin Negative Negative    Urobilinogen, Urine 0.2 Negative    Nitrite Negative Negative    Leukocyte Esterase Trace (A) Negative    Occult Blood Negative Negative    Micro Urine Req Microscopic    HCG QUAL SERUM   Result Value Ref Range    Beta-Hcg Qualitative Serum Negative Negative   URINE MICROSCOPIC (W/UA)   Result Value Ref Range    WBC 5-10 (A) /hpf    RBC 2-5 (A) /hpf    Bacteria Few (A) None /hpf    Epithelial Cells Few /hpf    Hyaline Cast 3-5 (A) /lpf   ESTIMATED GFR   Result Value Ref Range    GFR If African American >60 >60 mL/min/1.73 m 2    GFR If Non African American >60 >60 mL/min/1.73 m 2  "      RADIOLOGY  CT-ABDOMEN-PELVIS WITH   Final Result      1.  Wall thickening of transverse and descending colon suggesting colitis.   2.  Small amount of peritoneal fluid present.   3.  Appendix is not visualized.   4.  No evidence of bowel obstruction.   5.  Mildly prominent uterus, nonspecific.          COURSE & MEDICAL DECISION MAKING  Nursing notes and vital signs were reviewed. (See chart for details)  The patients records were reviewed, history was obtained from the patient;     ED evaluation for lower abdominal pain, right lower quadrant abdominal pain is really nonspecific, however CT suggests a colitis involving the transverse and descending colon.  Discomfort may be from the descending or sigmoid colon involvement.  Appendix not visualized but this is reassuring.  No localized reproducible McBurney point tenderness.  Labs are unremarkable, no leukocytosis, left shift or bandemia.  No electrolyte derangement.  Urinalysis with trace leukocyte esterase, 5-10 WBCs although with epithelials and bacteria as well.  Patient really has no urinary symptoms, culture has been added but she will not be treated otherwise for this alone.   History and exam less suggestive of infectious etiology, however patient without significant history for inflammatory disease, viral etiology has not been excluded. Patient will be treated empirically for the colitis with Augmentin.  No bloody stools.  No fever, tachycardia or hypotension.  She describes a family history for \"IBS\" but believes this to be irritable bowel.  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  She will follow-up with him when she returns for reevaluation of colonoscopy if indicated.  Pain controlled with Toradol and Zofran.  Patient declined fentanyl.  Tolerating oral fluids before discharge.    Patient is stable for discharge at this time, anticipatory guidance and diet modification provided, Augmentin for " 10 days, close follow-up is encouraged, and strict ED return instructions have been detailed. Patient is agreeable to the disposition and plan.    Patient's blood pressure was elevated in the emergency department, and has been referred to primary care for close monitoring.      FINAL IMPRESSION  (K52.9) Colitis  (R10.9) Abdominal pain, unspecified abdominal location      Electronically signed by: Amelia Otero, 12/30/2019 2:09 PM      This dictation was created using voice recognition software. The accuracy of the dictation is limited to the abilities of the software. I expect there may be some errors of grammar and possibly content. The nursing notes were reviewed and certain aspects of this information were incorporated into this note.

## 2020-01-01 LAB
BACTERIA UR CULT: NORMAL
SIGNIFICANT IND 70042: NORMAL
SITE SITE: NORMAL
SOURCE SOURCE: NORMAL

## 2020-11-09 ENCOUNTER — OFFICE VISIT (OUTPATIENT)
Dept: URGENT CARE | Facility: CLINIC | Age: 25
End: 2020-11-09
Payer: OTHER GOVERNMENT

## 2020-11-09 ENCOUNTER — APPOINTMENT (OUTPATIENT)
Dept: RADIOLOGY | Facility: IMAGING CENTER | Age: 25
End: 2020-11-09
Attending: PHYSICIAN ASSISTANT
Payer: OTHER GOVERNMENT

## 2020-11-09 VITALS
DIASTOLIC BLOOD PRESSURE: 70 MMHG | HEIGHT: 62 IN | HEART RATE: 90 BPM | OXYGEN SATURATION: 99 % | BODY MASS INDEX: 22.41 KG/M2 | SYSTOLIC BLOOD PRESSURE: 120 MMHG | WEIGHT: 121.8 LBS | RESPIRATION RATE: 14 BRPM | TEMPERATURE: 98 F

## 2020-11-09 DIAGNOSIS — J02.9 SORE THROAT: ICD-10-CM

## 2020-11-09 DIAGNOSIS — R07.9 CHEST PAIN, UNSPECIFIED TYPE: ICD-10-CM

## 2020-11-09 DIAGNOSIS — R07.89 CHEST TIGHTNESS: ICD-10-CM

## 2020-11-09 LAB
INT CON NEG: NEGATIVE
INT CON POS: POSITIVE
S PYO AG THROAT QL: NEGATIVE

## 2020-11-09 PROCEDURE — 99214 OFFICE O/P EST MOD 30 MIN: CPT | Performed by: PHYSICIAN ASSISTANT

## 2020-11-09 PROCEDURE — 93000 ELECTROCARDIOGRAM COMPLETE: CPT | Performed by: PHYSICIAN ASSISTANT

## 2020-11-09 PROCEDURE — 87880 STREP A ASSAY W/OPTIC: CPT | Performed by: PHYSICIAN ASSISTANT

## 2020-11-09 PROCEDURE — 71046 X-RAY EXAM CHEST 2 VIEWS: CPT | Mod: TC | Performed by: PHYSICIAN ASSISTANT

## 2020-11-09 RX ORDER — METHIMAZOLE 5 MG/1
5 TABLET ORAL DAILY
COMMUNITY

## 2020-11-09 ASSESSMENT — ENCOUNTER SYMPTOMS
NAUSEA: 0
COUGH: 0
ABDOMINAL PAIN: 0
SHORTNESS OF BREATH: 1
SINUS PAIN: 0
SORE THROAT: 1
CHILLS: 0
VOMITING: 0
FEVER: 0

## 2020-11-09 ASSESSMENT — FIBROSIS 4 INDEX: FIB4 SCORE: 0.52

## 2020-11-09 NOTE — PROGRESS NOTES
" Subjective:   Mikala Huitron is a 24 y.o. female who presents for Sore Throat (X 3-4 days, sore throat, feels like throat and chest is irriated, no pain when swallowing. ) and Chest Pain (Chest pain and SOB. The patient see a cardiologist in [place], x yesterday. The patient had a similar episode of chest pain a few months ago and was given abx.)        This is a new problem.  Patient complains of sore throat, congestion, chest tightness and discomfort x3 days.  Chest pain is substernal and is worse with breathing.  Patient states that pain was severe yesterday and is slightly improved today. \"Almost went to the ER yesterday it was so bad.\" Pain does not radiate to jaw, back, arm.  Nothing alleviates her pain.  She also has been experiencing some shortness of breath.  No nausea, vomiting, fevers, chills, abdominal pain lightheadedness, dizziness, presyncope, syncope.  Denies leg pain, leg swelling, history of VTE.  Not currently on OCP.  She does have history of Graves' disease and is on methimazole.  States that she has history of left-sided chest pain for the last 10 years.  She has been following with cardiology. No known diagnosis per patient.  States that she had an echocardiogram within the last month that was normal.  Current symptoms different than her baseline chest pain.  Her son is also ill with a sore throat.    Review of Systems   Constitutional: Negative for chills, fever and malaise/fatigue.   HENT: Positive for congestion and sore throat. Negative for sinus pain.    Respiratory: Positive for shortness of breath. Negative for cough.    Cardiovascular: Positive for chest pain.   Gastrointestinal: Negative for abdominal pain, nausea and vomiting.       PMH:  has a past medical history of Ectopic pregnancy.  MEDS:   Current Outpatient Medications:   •  methimazole (TAPAZOLE) 5 MG Tab, Take 5 mg by mouth every day., Disp: , Rfl:   ALLERGIES:   Allergies   Allergen Reactions   • Bactrim " "[Sulfamethoxazole-Trimethoprim] Hives     Hives all over body.    • Codeine Rash     Rash on face, itchy throat.    • Sulfa Drugs Hives     Hives all over body.      SURGHX: History reviewed. No pertinent surgical history.  SOCHX:  reports that she has never smoked. She has never used smokeless tobacco. She reports that she does not drink alcohol or use drugs.  FH: Family history was reviewed, no pertinent findings to report   Objective:   /70 (BP Location: Left arm, Patient Position: Sitting, BP Cuff Size: Adult)   Pulse 90   Temp 36.7 °C (98 °F) (Temporal)   Resp 14   Ht 1.575 m (5' 2\")   Wt 55.2 kg (121 lb 12.8 oz)   SpO2 99%   BMI 22.28 kg/m²   Physical Exam  Vitals signs reviewed.   Constitutional:       General: She is not in acute distress.     Appearance: Normal appearance. She is well-developed. She is not toxic-appearing.   HENT:      Head: Normocephalic and atraumatic.      Right Ear: External ear normal.      Left Ear: External ear normal.      Nose: Congestion present. No rhinorrhea.      Mouth/Throat:      Lips: Pink.      Mouth: Mucous membranes are moist.      Pharynx: Oropharynx is clear. Uvula midline. No posterior oropharyngeal erythema.      Comments: Hoarse voice  Eyes:      General: Gaze aligned appropriately.   Neck:      Musculoskeletal: Neck supple.   Cardiovascular:      Rate and Rhythm: Normal rate and regular rhythm.      Heart sounds: Normal heart sounds, S1 normal and S2 normal.   Pulmonary:      Effort: Pulmonary effort is normal. No respiratory distress.      Breath sounds: Normal breath sounds. No stridor. No decreased breath sounds, wheezing, rhonchi or rales.   Chest:      Comments: Palpation of sternal borders and chest wall does not reproduce patient's symptoms.  Musculoskeletal:      Comments: No LE edema    Skin:     General: Skin is warm and dry.      Capillary Refill: Capillary refill takes less than 2 seconds.   Neurological:      Mental Status: She is alert and " oriented to person, place, and time.      Comments: CN2-12 grossly intact   Psychiatric:         Speech: Speech normal.         Behavior: Behavior normal.             EKG:  Comparison: Not available  Rhythm: Sinus rhythm  Ectopy: None  Rate: 82  QRS Axis: Normal  Conduction: Normal  ST segment: borderline ST segment elevation V1-V3. No reciprocal changes.  T waves: upright  Clinical impression: Borderline ST segment elevation in precordial leads    CXR:  11/9/2020 1:40 PM     HISTORY/REASON FOR EXAM:  Shortness of Breath  Sore throat, chest tightness, dyspnea.     TECHNIQUE/EXAM DESCRIPTION AND NUMBER OF VIEWS:  Two views of the chest.     COMPARISON:  None available.     FINDINGS:  Cardiomediastinal contour is within normal limits.  No focal pulmonary consolidation.  No pleural fluid collection or pneumothorax.  No major bony abnormality is seen.     IMPRESSION:     No acute cardiopulmonary disease.  Assessment/Plan:   1. Chest pain, unspecified type  - EKG - Clinic Performed  - DX-CHEST-2 VIEWS; Future    2. Sore throat  - POCT Rapid Strep A    Other orders  - methimazole (TAPAZOLE) 5 MG Tab; Take 5 mg by mouth every day.    Chest pain is pleuritic and described as severe at times.  This does not look like ACS to me, however patient does have borderline ST segment elevation in V1 through V3.  This may be baseline for patient, however I do not have prior EKG for comparison. Cannot r/o cardiac pathologies. Patient's chest x-ray is normal and there is no evidence of bronchitis or pneumonia on physical exam.  She does have some upper respiratory symptoms, however it is unlikely that an upper respiratory viral illness or even COVID-19 would cause such as severe pain.  Pain is not reproducible on physical exam. PE is a consideration.  I do recommend that the patient receive additional work-up at a higher level of care.  Patient is amenable to this and will go to [facility] ED.    Differential diagnosis, natural  history, supportive care, and indications for immediate follow-up discussed.

## 2021-11-19 ENCOUNTER — HOSPITAL ENCOUNTER (OUTPATIENT)
Dept: RADIOLOGY | Facility: MEDICAL CENTER | Age: 26
End: 2021-11-19
Attending: NURSE PRACTITIONER
Payer: OTHER GOVERNMENT

## 2021-11-19 ENCOUNTER — OFFICE VISIT (OUTPATIENT)
Dept: URGENT CARE | Facility: PHYSICIAN GROUP | Age: 26
End: 2021-11-19
Payer: OTHER GOVERNMENT

## 2021-11-19 VITALS
HEIGHT: 62 IN | TEMPERATURE: 98.7 F | RESPIRATION RATE: 12 BRPM | HEART RATE: 103 BPM | SYSTOLIC BLOOD PRESSURE: 110 MMHG | WEIGHT: 120 LBS | OXYGEN SATURATION: 99 % | DIASTOLIC BLOOD PRESSURE: 68 MMHG | BODY MASS INDEX: 22.08 KG/M2

## 2021-11-19 DIAGNOSIS — R10.31 RLQ ABDOMINAL PAIN: ICD-10-CM

## 2021-11-19 DIAGNOSIS — R10.32 LEFT LOWER QUADRANT ABDOMINAL PAIN: ICD-10-CM

## 2021-11-19 DIAGNOSIS — R19.00 ABDOMINAL MASS, UNSPECIFIED ABDOMINAL LOCATION: ICD-10-CM

## 2021-11-19 DIAGNOSIS — R10.9 ABDOMINAL PAIN, UNSPECIFIED ABDOMINAL LOCATION: ICD-10-CM

## 2021-11-19 LAB
APPEARANCE UR: CLEAR
BILIRUB UR STRIP-MCNC: NEGATIVE MG/DL
COLOR UR AUTO: YELLOW
GLUCOSE UR STRIP.AUTO-MCNC: NEGATIVE MG/DL
INT CON NEG: NORMAL
INT CON POS: NORMAL
KETONES UR STRIP.AUTO-MCNC: NORMAL MG/DL
LEUKOCYTE ESTERASE UR QL STRIP.AUTO: NORMAL
NITRITE UR QL STRIP.AUTO: NEGATIVE
PH UR STRIP.AUTO: 5.5 [PH] (ref 5–8)
POC URINE PREGNANCY TEST: NEGATIVE
PROT UR QL STRIP: NEGATIVE MG/DL
RBC UR QL AUTO: NEGATIVE
SP GR UR STRIP.AUTO: 1.02
UROBILINOGEN UR STRIP-MCNC: 0.2 MG/DL

## 2021-11-19 PROCEDURE — 81025 URINE PREGNANCY TEST: CPT | Performed by: NURSE PRACTITIONER

## 2021-11-19 PROCEDURE — 700117 HCHG RX CONTRAST REV CODE 255: Performed by: NURSE PRACTITIONER

## 2021-11-19 PROCEDURE — 81002 URINALYSIS NONAUTO W/O SCOPE: CPT | Performed by: NURSE PRACTITIONER

## 2021-11-19 PROCEDURE — 99214 OFFICE O/P EST MOD 30 MIN: CPT | Performed by: NURSE PRACTITIONER

## 2021-11-19 PROCEDURE — 74177 CT ABD & PELVIS W/CONTRAST: CPT

## 2021-11-19 RX ADMIN — IOHEXOL 100 ML: 350 INJECTION, SOLUTION INTRAVENOUS at 15:53

## 2021-11-19 RX ADMIN — IOHEXOL 25 ML: 240 INJECTION, SOLUTION INTRATHECAL; INTRAVASCULAR; INTRAVENOUS; ORAL at 15:52

## 2021-11-19 ASSESSMENT — ENCOUNTER SYMPTOMS
NAUSEA: 0
DIARRHEA: 0
CHILLS: 0
CONSTIPATION: 0
VOMITING: 0
FEVER: 0
CONSTITUTIONAL NEGATIVE: 1

## 2021-11-19 ASSESSMENT — FIBROSIS 4 INDEX: FIB4 SCORE: 0.55

## 2021-11-19 NOTE — PROGRESS NOTES
Subjective:     Mikala Huitron is a 25 y.o. female who presents for Abdominal Pain (tender lumps/ nodules x 4 days )       Abdominal Pain  This is a new problem. The problem has been gradually worsening. Pertinent negatives include no constipation, diarrhea, fever, nausea or vomiting. Her past medical history is significant for abdominal surgery (Ectopic pregnancy).     Patient traveling from out of state.  Currently on a 2-week vacation here.  Started to notice painful lumps and tenderness at her bilateral abdominal wall.  Reports having had similar symptoms behind her legs and her arms.  Also reports yesterday, had severe right lower quadrant abdominal pain.  It was so bad that she contemplated going to the ER.  Reports a 2-year history of other medical problems which she is following multiple specialists for at home.    Patient was screened prior to rooming and denied COVID-19 diagnosis or contact with a person who has been diagnosed or is suspected to have COVID-19. During this visit, appropriate PPE was worn, hand hygiene was performed, and the patient and any visitors were masked.     PMH:  has a past medical history of Ectopic pregnancy.    MEDS:   Current Outpatient Medications:   •  methimazole (TAPAZOLE) 5 MG Tab, Take 5 mg by mouth every day., Disp: , Rfl:     ALLERGIES:   Allergies   Allergen Reactions   • Bactrim [Sulfamethoxazole-Trimethoprim] Hives     Hives all over body.    • Codeine Rash     Rash on face, itchy throat.    • Sulfa Drugs Hives     Hives all over body.      SURGHX: History reviewed. No pertinent surgical history.    SOCHX:  reports that she has never smoked. She has never used smokeless tobacco. She reports that she does not drink alcohol and does not use drugs.     FH: Reviewed with patient, not pertinent to this visit.    Review of Systems   Constitutional: Negative.  Negative for chills, fever and malaise/fatigue.   Gastrointestinal: Positive for abdominal pain (Lumps,  "tenderness). Negative for constipation, diarrhea, nausea and vomiting.   Genitourinary: Negative.    All other systems reviewed and are negative.    Additional details per HPI.      Objective:     /68 (BP Location: Left arm, Patient Position: Sitting, BP Cuff Size: Adult)   Pulse (!) 103   Temp 37.1 °C (98.7 °F) (Temporal)   Resp 12   Ht 1.575 m (5' 2\")   Wt 54.4 kg (120 lb)   SpO2 99%   Breastfeeding No   BMI 21.95 kg/m²     Physical Exam  Vitals reviewed.   Constitutional:       General: She is not in acute distress.     Appearance: She is well-developed. She is not ill-appearing or toxic-appearing.   HENT:      Head: Normocephalic.      Right Ear: External ear normal.      Left Ear: External ear normal.   Eyes:      General: Vision grossly intact.      Extraocular Movements: Extraocular movements intact.      Conjunctiva/sclera: Conjunctivae normal.   Cardiovascular:      Rate and Rhythm: Tachycardia present.   Pulmonary:      Effort: Pulmonary effort is normal. No respiratory distress.   Abdominal:      Palpations: Abdomen is soft. There is mass (Small, palpable nodules with light palpation, likely lipomas, no obvious hernia).      Tenderness: There is generalized abdominal tenderness (Light palpation, no tenderness with deep palpation). There is no guarding or rebound.   Musculoskeletal:         General: No deformity. Normal range of motion.      Cervical back: Normal range of motion.   Skin:     General: Skin is warm and dry.      Coloration: Skin is not pale.   Neurological:      Mental Status: She is alert and oriented to person, place, and time.      Sensory: No sensory deficit.      Motor: No weakness.   Psychiatric:         Behavior: Behavior normal. Behavior is cooperative.     UA: trace ketone, LE, otherwise unremarkable    POCT pregnancy: negative    Details    Reading Physician Reading Date Result Priority   Chao Swartz M.D.  [phone] 11/19/2021 STAT     Narrative & " Impression     11/19/2021 3:42 PM     HISTORY/REASON FOR EXAM:  Right lower quadrant pain and a lump for 4 days.     TECHNIQUE/EXAM DESCRIPTION:   CT scan of the abdomen and pelvis with contrast.     Contrast-enhanced helical scanning was obtained from the diaphragmatic domes through the pubic symphysis following the bolus administration of nonionic contrast without complication.     100 mL of Omnipaque 350 nonionic contrast was administered without complication.     Low dose optimization technique was utilized for this CT exam including automated exposure control and adjustment of the mA and/or kV according to patient size.     COMPARISON: 12/30/2019     FINDINGS:  Lower Chest: No consolidation or effusion. .     Liver: Normal.     Spleen: Unremarkable.     Pancreas: Normal.  No duct dilation or adjacent fat stranding. .     Gallbladder: No calcified stones.  Biliary: Nondilated.     Adrenal glands: Normal.     Kidneys: Unremarkable.  No hydronephrosis.     Bowel: No obstruction or acute inflammation. Short proximal, left epigastrium small bowel intussusceptions are identified which do not cause obstruction or other evidence of compromise. No lead point is seen. A normal gas-filled retrocecal appendix is   seen     Lymph nodes: No adenopathy.     Vasculature: No aneurysm. .     Peritoneum: No ascites or mass. .     Pelvis:  No adenopathy or free fluid.     Normal configuration of the bladder.     Normal size uterus which contains a a centrally positioned IUD. There is a 33 mm right adnexal cyst which is likely intraovarian and has no calcification or fat density.     IMPRESSION:     1.  No CT evidence of hydronephrosis or acute appendicitis     2.  Short segment small bowel intussusceptions are nonspecific but can be seen with celiac disease. In isolation they commonly are normal. No long the segmental intussusception or the point is seen to suggest a severe/acute underlying process     3.  Right adnexal cyst is  above average in size but within normal physiologic limits           Exam Ended: 11/19/21  3:51 PM Last Resulted: 11/19/21  4:13 PM              Assessment/Plan:     1. Abdominal pain, unspecified abdominal location  - POCT Urinalysis  - POCT PREGNANCY  - CT-ABDOMEN-PELVIS WITH; Future    2. RLQ abdominal pain  - CT-ABDOMEN-PELVIS WITH; Future    3. Abdominal mass, unspecified abdominal location  - CT-ABDOMEN-PELVIS WITH; Future    Vital signs stable, afebrile, no acute distress at this time.     CT abdomen pending to further evaluate symptoms.  Patient will be contacted with the results.    Results reviewed with patient over the phone.    Patient will follow up with her gastroenterologist when she gets back home.    Warning signs reviewed. Strict return/ER precautions advised.     Differential diagnosis, natural history, supportive care, over-the-counter symptom management per 's instructions, close monitoring, and indications for immediate follow-up discussed.     All questions answered. Patient agrees with the plan of care.    Discharge summary provided through [facility].    Billing note: 30 minutes was allotted and spent for patient care and coordination of care (not reported separately) including preparing for the visit, obtaining/reviewing history, performing an exam/evaluation, ordering testing, developing a plan of care, counseling/educating the patient, developing the discharge summary for release to [facility], updating the medical record, reconciling outside information, and documentation. Care specific to this encounter was summarized here. Please refer to the chart for additional details on the care provided.

## 2021-11-21 PROBLEM — M62.81 MUSCLE WEAKNESS: Status: ACTIVE | Noted: 2021-10-19

## 2021-11-21 PROBLEM — M25.50 MULTIPLE JOINT PAIN: Status: ACTIVE | Noted: 2021-10-18

## 2021-11-21 PROBLEM — R93.3 ABNORMAL CT SCAN, GASTROINTESTINAL TRACT: Status: ACTIVE | Noted: 2021-07-22

## 2021-11-21 PROBLEM — Z87.59 HISTORY OF ECTOPIC PREGNANCY: Status: ACTIVE | Noted: 2021-04-08

## 2021-11-21 PROBLEM — N64.4 PAIN OF BOTH BREASTS: Status: ACTIVE | Noted: 2021-04-08

## 2021-11-21 PROBLEM — E05.00 GRAVES DISEASE: Status: ACTIVE | Noted: 2021-03-03

## 2021-11-21 PROBLEM — K58.9 IRRITABLE BOWEL SYNDROME: Status: ACTIVE | Noted: 2021-04-08

## 2021-11-21 PROBLEM — N39.41 URGE INCONTINENCE OF URINE: Status: ACTIVE | Noted: 2021-04-08

## 2021-11-21 PROBLEM — R20.0 NUMBNESS: Status: ACTIVE | Noted: 2021-10-19

## 2021-11-21 PROBLEM — R10.11 ABDOMINAL PAIN, RIGHT UPPER QUADRANT: Status: ACTIVE | Noted: 2021-07-22

## 2021-11-21 PROBLEM — R19.7 DIARRHEA OF PRESUMED INFECTIOUS ORIGIN: Status: ACTIVE | Noted: 2021-07-22

## 2021-11-21 PROBLEM — R10.84 ABDOMINAL PAIN, GENERALIZED: Status: ACTIVE | Noted: 2021-07-22

## 2021-11-21 PROBLEM — G43.909 MIGRAINE: Status: ACTIVE | Noted: 2021-10-18

## 2021-11-21 RX ORDER — FAMOTIDINE 20 MG/1
20 TABLET, FILM COATED ORAL
COMMUNITY
Start: 2021-10-18 | End: 2022-10-18

## 2021-11-21 RX ORDER — GABAPENTIN 100 MG/1
CAPSULE ORAL
COMMUNITY

## 2021-11-21 RX ORDER — MELOXICAM 15 MG/1
TABLET ORAL
COMMUNITY

## 2021-11-21 ASSESSMENT — VISUAL ACUITY: OU: 1

## 2021-11-21 ASSESSMENT — ENCOUNTER SYMPTOMS: ABDOMINAL PAIN: 1

## 2021-11-21 NOTE — PATIENT INSTRUCTIONS

## 2023-05-04 ENCOUNTER — DOCTOR'S OFFICE (OUTPATIENT)
Dept: URBAN - METROPOLITAN AREA CLINIC 157 | Facility: CLINIC | Age: 28
Setting detail: OPHTHALMOLOGY
End: 2023-05-04
Payer: OTHER GOVERNMENT

## 2023-05-04 DIAGNOSIS — R51.9: ICD-10-CM

## 2023-05-04 DIAGNOSIS — H52.13: ICD-10-CM

## 2023-05-04 PROCEDURE — 99204 OFFICE O/P NEW MOD 45 MIN: CPT | Performed by: OPHTHALMOLOGY

## 2023-05-04 ASSESSMENT — SPHEQUIV_DERIVED
OD_SPHEQUIV: -0.25
OS_SPHEQUIV: -0.25
OD_SPHEQUIV: 0
OS_SPHEQUIV: -0.25

## 2023-05-04 ASSESSMENT — AXIALLENGTH_DERIVED
OS_AL: 23.325
OD_AL: 23.325
OD_AL: 23.2302
OS_AL: 23.325

## 2023-05-04 ASSESSMENT — VISUAL ACUITY
OS_BCVA: 20/30 -1
OD_BCVA: 20/20 -1

## 2023-05-04 ASSESSMENT — REFRACTION_MANIFEST
OD_SPHERE: -0.50
OS_CYLINDER: +0.50
OS_AXIS: 160
OD_CYLINDER: +0.50
OD_AXIS: 027
OS_VA1: 20/20
OD_VA1: 20/20
OS_SPHERE: -0.50

## 2023-05-04 ASSESSMENT — REFRACTION_AUTOREFRACTION
OS_SPHERE: -0.50
OD_CYLINDER: +0.50
OS_CYLINDER: +0.50
OD_SPHERE: -0.25
OS_AXIS: 160
OD_AXIS: 020

## 2023-05-04 ASSESSMENT — TONOMETRY
OD_IOP_MMHG: 15
OS_IOP_MMHG: 14
OD_IOP_MMHG: 14
OS_IOP_MMHG: 15

## 2023-05-04 ASSESSMENT — KERATOMETRY
OD_K1POWER_DIOPTERS: 44.25
OS_K1POWER_DIOPTERS: 44.25
OD_AXISANGLE_DEGREES: 040
OD_K2POWER_DIOPTERS: 44.75
OS_K2POWER_DIOPTERS: 44.75
OS_AXISANGLE_DEGREES: 125

## 2023-05-04 ASSESSMENT — CONFRONTATIONAL VISUAL FIELD TEST (CVF)
OS_FINDINGS: FULL
OD_FINDINGS: FULL

## 2023-06-06 ENCOUNTER — APPOINTMENT (OUTPATIENT)
Dept: RADIOLOGY | Facility: MEDICAL CENTER | Age: 28
End: 2023-06-06
Attending: EMERGENCY MEDICINE
Payer: OTHER GOVERNMENT

## 2023-06-06 ENCOUNTER — HOSPITAL ENCOUNTER (EMERGENCY)
Facility: MEDICAL CENTER | Age: 28
End: 2023-06-06
Attending: EMERGENCY MEDICINE
Payer: OTHER GOVERNMENT

## 2023-06-06 VITALS
WEIGHT: 137.79 LBS | DIASTOLIC BLOOD PRESSURE: 61 MMHG | HEIGHT: 62 IN | HEART RATE: 73 BPM | RESPIRATION RATE: 14 BRPM | BODY MASS INDEX: 25.36 KG/M2 | TEMPERATURE: 97.3 F | OXYGEN SATURATION: 98 % | SYSTOLIC BLOOD PRESSURE: 99 MMHG

## 2023-06-06 DIAGNOSIS — M79.661 PAIN OF RIGHT LOWER LEG: ICD-10-CM

## 2023-06-06 LAB
ANION GAP SERPL CALC-SCNC: 11 MMOL/L (ref 7–16)
BASOPHILS # BLD AUTO: 0.3 % (ref 0–1.8)
BASOPHILS # BLD: 0.02 K/UL (ref 0–0.12)
BUN SERPL-MCNC: 16 MG/DL (ref 8–22)
CALCIUM SERPL-MCNC: 8.7 MG/DL (ref 8.5–10.5)
CHLORIDE SERPL-SCNC: 106 MMOL/L (ref 96–112)
CO2 SERPL-SCNC: 20 MMOL/L (ref 20–33)
CREAT SERPL-MCNC: 0.59 MG/DL (ref 0.5–1.4)
EOSINOPHIL # BLD AUTO: 0.09 K/UL (ref 0–0.51)
EOSINOPHIL NFR BLD: 1.6 % (ref 0–6.9)
ERYTHROCYTE [DISTWIDTH] IN BLOOD BY AUTOMATED COUNT: 40.9 FL (ref 35.9–50)
GFR SERPLBLD CREATININE-BSD FMLA CKD-EPI: 126 ML/MIN/1.73 M 2
GLUCOSE SERPL-MCNC: 95 MG/DL (ref 65–99)
HCG SERPL QL: NEGATIVE
HCT VFR BLD AUTO: 38.9 % (ref 37–47)
HGB BLD-MCNC: 12.9 G/DL (ref 12–16)
IMM GRANULOCYTES # BLD AUTO: 0.01 K/UL (ref 0–0.11)
IMM GRANULOCYTES NFR BLD AUTO: 0.2 % (ref 0–0.9)
LYMPHOCYTES # BLD AUTO: 1.4 K/UL (ref 1–4.8)
LYMPHOCYTES NFR BLD: 24.4 % (ref 22–41)
MAGNESIUM SERPL-MCNC: 2.1 MG/DL (ref 1.5–2.5)
MCH RBC QN AUTO: 31.5 PG (ref 27–33)
MCHC RBC AUTO-ENTMCNC: 33.2 G/DL (ref 32.2–35.5)
MCV RBC AUTO: 94.9 FL (ref 81.4–97.8)
MONOCYTES # BLD AUTO: 0.5 K/UL (ref 0–0.85)
MONOCYTES NFR BLD AUTO: 8.7 % (ref 0–13.4)
NEUTROPHILS # BLD AUTO: 3.72 K/UL (ref 1.82–7.42)
NEUTROPHILS NFR BLD: 64.8 % (ref 44–72)
NRBC # BLD AUTO: 0 K/UL
NRBC BLD-RTO: 0 /100 WBC (ref 0–0.2)
PLATELET # BLD AUTO: 241 K/UL (ref 164–446)
PMV BLD AUTO: 9.2 FL (ref 9–12.9)
POTASSIUM SERPL-SCNC: 4 MMOL/L (ref 3.6–5.5)
RBC # BLD AUTO: 4.1 M/UL (ref 4.2–5.4)
SODIUM SERPL-SCNC: 137 MMOL/L (ref 135–145)
WBC # BLD AUTO: 5.7 K/UL (ref 4.8–10.8)

## 2023-06-06 PROCEDURE — 83735 ASSAY OF MAGNESIUM: CPT

## 2023-06-06 PROCEDURE — 99284 EMERGENCY DEPT VISIT MOD MDM: CPT

## 2023-06-06 PROCEDURE — 80048 BASIC METABOLIC PNL TOTAL CA: CPT

## 2023-06-06 PROCEDURE — 84703 CHORIONIC GONADOTROPIN ASSAY: CPT

## 2023-06-06 PROCEDURE — 36415 COLL VENOUS BLD VENIPUNCTURE: CPT

## 2023-06-06 PROCEDURE — 93971 EXTREMITY STUDY: CPT | Mod: 26,RT | Performed by: INTERNAL MEDICINE

## 2023-06-06 PROCEDURE — 700105 HCHG RX REV CODE 258: Performed by: EMERGENCY MEDICINE

## 2023-06-06 PROCEDURE — 93971 EXTREMITY STUDY: CPT | Mod: RT

## 2023-06-06 PROCEDURE — 700101 HCHG RX REV CODE 250: Performed by: EMERGENCY MEDICINE

## 2023-06-06 PROCEDURE — 85025 COMPLETE CBC W/AUTO DIFF WBC: CPT

## 2023-06-06 RX ORDER — SODIUM CHLORIDE 9 MG/ML
1000 INJECTION, SOLUTION INTRAVENOUS ONCE
Status: COMPLETED | OUTPATIENT
Start: 2023-06-06 | End: 2023-06-06

## 2023-06-06 RX ORDER — KETOROLAC TROMETHAMINE 30 MG/ML
15 INJECTION, SOLUTION INTRAMUSCULAR; INTRAVENOUS ONCE
Status: DISCONTINUED | OUTPATIENT
Start: 2023-06-06 | End: 2023-06-06 | Stop reason: HOSPADM

## 2023-06-06 RX ORDER — LIDOCAINE 50 MG/G
1 PATCH TOPICAL EVERY 24 HOURS
Status: DISCONTINUED | OUTPATIENT
Start: 2023-06-06 | End: 2023-06-06 | Stop reason: HOSPADM

## 2023-06-06 RX ORDER — LIDOCAINE 50 MG/G
1 PATCH TOPICAL EVERY 24 HOURS
Qty: 10 PATCH | Refills: 0 | Status: SHIPPED | OUTPATIENT
Start: 2023-06-06

## 2023-06-06 RX ORDER — CYCLOBENZAPRINE HCL 5 MG
5-10 TABLET ORAL 3 TIMES DAILY PRN
Qty: 30 TABLET | Refills: 0 | Status: SHIPPED | OUTPATIENT
Start: 2023-06-06

## 2023-06-06 RX ADMIN — SODIUM CHLORIDE 1000 ML: 9 INJECTION, SOLUTION INTRAVENOUS at 09:08

## 2023-06-06 RX ADMIN — LIDOCAINE 1 PATCH: 700 PATCH TOPICAL at 10:49

## 2023-06-06 NOTE — ED NOTES
Discharge instructions and follow up care discussed with patient. Patient given time to ask questions and verbalized understanding. PIV removed. Patient given 2 new prescriptions. Patient AOx4 at discharge and ambulatory to lobby with steady gait.

## 2023-06-06 NOTE — ED TRIAGE NOTES
".  Chief Complaint   Patient presents with    Leg Pain     Reports right calf pain for the last 4 days, worsens with flexion. Recent 12 hour flight. No swelling noted. No numbness or tingling.        26 yo female ambulatory to triage for above complaint.      Pt is alert and oriented, speaking in full sentences, follows commands and responds appropriately to questions.      Patient placed back in lobby and educated on triage process. Asked to inform RN of any changes.    /75   Pulse 95   Temp 36.1 °C (97 °F) (Temporal)   Resp 18   Ht 1.575 m (5' 2\")   Wt 62.5 kg (137 lb 12.6 oz)   LMP 05/11/2023 (Approximate)   SpO2 98%   BMI 25.20 kg/m²     "

## 2023-06-06 NOTE — DISCHARGE INSTRUCTIONS
You were seen in the ER for calf pain.  Thankfully, your labs and imaging are reassuring.  Your electrolytes are normal.  You do not have a blood clot.  I am sending you home with a prescription for Flexeril which is a muscle relaxer, take it as directed.  Do not drink alcohol or drive after taking the medication as it may make you sleepy.  Follow-up with your primary care physician when you return home.  Return with new or worsening symptoms.  I hope you feel better soon!

## 2023-06-06 NOTE — ED PROVIDER NOTES
"ED Provider Note    CHIEF COMPLAINT  Chief Complaint   Patient presents with    Leg Pain     Reports right calf pain for the last 4 days, worsens with flexion. Recent 12 hour flight. No swelling noted. No numbness or tingling.        EXTERNAL RECORDS REVIEWED  Other none    HPI/ROS  LIMITATION TO HISTORY   Select: : None  OUTSIDE HISTORIAN(S):  None    Mikala Huitron is a 27 y.o. female who presents with right calf pain that has been ongoing for the past 4 days.  Patient notes that the pain started after she took a long flight from [place] to the [facility] to visit family.  The pain woke her from sleep and is described as a severe charley horse.  She has tried migraine Excedrin as well as ibuprofen without any relief.  She denies any decrease in sensation, exertional component, swelling, redness, or fevers.  She has never had a DVT or PE.    PAST MEDICAL HISTORY   has a past medical history of Ectopic pregnancy and Graves disease.    SURGICAL HISTORY  patient denies any surgical history    FAMILY HISTORY  History reviewed. No pertinent family history.    SOCIAL HISTORY  Social History     Tobacco Use    Smoking status: Never    Smokeless tobacco: Never   Vaping Use    Vaping Use: Never used   Substance and Sexual Activity    Alcohol use: No    Drug use: No    Sexual activity: Not on file       CURRENT MEDICATIONS  Home Medications    **Home medications have not yet been reviewed for this encounter**         ALLERGIES  Allergies   Allergen Reactions    Codeine Rash     Rash on face, itchy throat.   rash    Sulfa Drugs Hives     Hives all over body.   rash    Sulfamethoxazole-Trimethoprim Hives     Hives all over body.   Rash       PHYSICAL EXAM  VITAL SIGNS: /75   Pulse 95   Temp 36.1 °C (97 °F) (Temporal)   Resp 18   Ht 1.575 m (5' 2\")   Wt 62.5 kg (137 lb 12.6 oz)   LMP 05/11/2023 (Approximate)   SpO2 98%   BMI 25.20 kg/m²    Physical Exam  Vitals and nursing note reviewed. "   Constitutional:       Appearance: Normal appearance.   HENT:      Head: Normocephalic and atraumatic.      Right Ear: External ear normal.      Left Ear: External ear normal.      Nose: Nose normal.      Mouth/Throat:      Mouth: Mucous membranes are dry.   Eyes:      Extraocular Movements: Extraocular movements intact.      Conjunctiva/sclera: Conjunctivae normal.      Pupils: Pupils are equal, round, and reactive to light.   Cardiovascular:      Rate and Rhythm: Normal rate.      Pulses: Normal pulses.   Pulmonary:      Effort: Pulmonary effort is normal.   Musculoskeletal:      Cervical back: Normal range of motion and neck supple.      Comments: Tenderness over the midportion of the right calf.  No overlying erythema, crepitus, fluctuance.  Full range of motion of right knee and right ankle.  No obvious edema.   Skin:     General: Skin is warm and dry.   Neurological:      General: No focal deficit present.      Mental Status: She is alert and oriented to person, place, and time.      Sensory: No sensory deficit.   Psychiatric:         Mood and Affect: Mood normal.         Behavior: Behavior normal.       DIAGNOSTIC STUDIES / PROCEDURES  LABS  Results for orders placed or performed during the hospital encounter of 06/06/23   CBC WITH DIFFERENTIAL   Result Value Ref Range    WBC 5.7 4.8 - 10.8 K/uL    RBC 4.10 (L) 4.20 - 5.40 M/uL    Hemoglobin 12.9 12.0 - 16.0 g/dL    Hematocrit 38.9 37.0 - 47.0 %    MCV 94.9 81.4 - 97.8 fL    MCH 31.5 27.0 - 33.0 pg    MCHC 33.2 32.2 - 35.5 g/dL    RDW 40.9 35.9 - 50.0 fL    Platelet Count 241 164 - 446 K/uL    MPV 9.2 9.0 - 12.9 fL    Neutrophils-Polys 64.80 44.00 - 72.00 %    Lymphocytes 24.40 22.00 - 41.00 %    Monocytes 8.70 0.00 - 13.40 %    Eosinophils 1.60 0.00 - 6.90 %    Basophils 0.30 0.00 - 1.80 %    Immature Granulocytes 0.20 0.00 - 0.90 %    Nucleated RBC 0.00 0.00 - 0.20 /100 WBC    Neutrophils (Absolute) 3.72 1.82 - 7.42 K/uL    Lymphs (Absolute) 1.40 1.00 -  4.80 K/uL    Monos (Absolute) 0.50 0.00 - 0.85 K/uL    Eos (Absolute) 0.09 0.00 - 0.51 K/uL    Baso (Absolute) 0.02 0.00 - 0.12 K/uL    Immature Granulocytes (abs) 0.01 0.00 - 0.11 K/uL    NRBC (Absolute) 0.00 K/uL   Basic Metabolic Panel   Result Value Ref Range    Sodium 137 135 - 145 mmol/L    Potassium 4.0 3.6 - 5.5 mmol/L    Chloride 106 96 - 112 mmol/L    Co2 20 20 - 33 mmol/L    Glucose 95 65 - 99 mg/dL    Bun 16 8 - 22 mg/dL    Creatinine 0.59 0.50 - 1.40 mg/dL    Calcium 8.7 8.5 - 10.5 mg/dL    Anion Gap 11.0 7.0 - 16.0   MAGNESIUM   Result Value Ref Range    Magnesium 2.1 1.5 - 2.5 mg/dL   BETA-HCG QUALITATIVE SERUM   Result Value Ref Range    Beta-Hcg Qualitative Serum Negative Negative   ESTIMATED GFR   Result Value Ref Range    GFR (CKD-EPI) 126 >60 mL/min/1.73 m 2     RADIOLOGY  Radiologist interpretation:   US-EXTREMITY VENOUS LOWER UNILAT RIGHT   Final Result        COURSE & MEDICAL DECISION MAKING    ED Observation Status? Yes; I am placing the patient in to an observation status due to a diagnostic uncertainty as well as therapeutic intensity. Patient placed in observation status at 8:32 AM, 6/6/2023.     Observation plan is as follows: Labs, imaging, medication    Upon Reevaluation, the patient's condition has: Improved; and will be discharged.    Patient discharged from ED Observation status at 10:34 AM (Time) 6/6/2023 (Date).     INITIAL ASSESSMENT, COURSE AND PLAN  Care Narrative: This is a 27 year old female who is here with right calf pain since arriving to [place] after a flight from [place].    DDx includes, but is not limited to, DVT, muscle strain, cellulitis, abscess, necrotizing fasciitis, muscle spasm due to electrolyte abnormality or dehydration.    Arrives AF with normal VS. Appears slightly dehydrated but non-toxic. The RLE is neurovascularly intact. There is tenderness over the right calf with a positive Michelle's sign. No cords, erythema, crepitus, or fluctuance is present to  suggest cellulitis, necrotizing fasciitis, abscess. Compartments are soft. No evidence for compartment syndrome.    Started on IV fluids for obvious dehydration. Labs were reassuring without leukocytosis and also demonstrated normal electrolytes and renal function.    DVT US thankfully did not demonstrate thrombus.    Patient was reevaluated at bedside. She is resting comfortably. We discussed lab/imaging results and she was provided with reassurance. Symptoms are most likely related to muscle strain. We will trial lidoderm patches and she will f/u with her PCP when she returns home to [place]. Discharged in good and stable condition with very strict return precautions.    HYDRATION: Based on the patient's presentation of Dehydration the patient was given IV fluids. IV Hydration was used because oral hydration was not adequate alone. Upon recheck following hydration, the patient was improved.    ADDITIONAL PROBLEM LIST  None  DISPOSITION AND DISCUSSIONS  I have discussed management of the patient with the following physicians and KEZIA's: None    Discussion of management with other QHP or appropriate source(s): None     Escalation of care considered, and ultimately not performed:acute inpatient care management, however at this time, the patient is most appropriate for outpatient management    Barriers to care at this time, including but not limited to:  Patient does not live in this area and is visiting from [place] .     Decision tools and prescription drugs considered including, but not limited to:  Flexeril and lidoderm patch .    FINAL DIAGNOSIS  1. Pain of right lower leg      Electronically signed by: Ludin Borjas M.D., 6/6/2023 8:32 AM

## 2023-10-25 ENCOUNTER — DOCTOR'S OFFICE (OUTPATIENT)
Dept: URBAN - METROPOLITAN AREA CLINIC 157 | Facility: CLINIC | Age: 28
Setting detail: OPHTHALMOLOGY
End: 2023-10-25
Payer: OTHER GOVERNMENT

## 2023-10-25 DIAGNOSIS — H52.13: ICD-10-CM

## 2023-10-25 DIAGNOSIS — H16.223: ICD-10-CM

## 2023-10-25 DIAGNOSIS — G43.111: ICD-10-CM

## 2023-10-25 DIAGNOSIS — H53.15: ICD-10-CM

## 2023-10-25 PROCEDURE — 92015 DETERMINE REFRACTIVE STATE: CPT | Performed by: OPTOMETRIST

## 2023-10-25 PROCEDURE — 92014 COMPRE OPH EXAM EST PT 1/>: CPT | Performed by: OPTOMETRIST

## 2023-10-25 ASSESSMENT — TONOMETRY
OS_IOP_MMHG: 16
OD_IOP_MMHG: 16

## 2023-10-25 ASSESSMENT — CONFRONTATIONAL VISUAL FIELD TEST (CVF)
OS_FINDINGS: FULL
OD_FINDINGS: FULL

## 2023-10-25 ASSESSMENT — TEAR BREAK UP TIME (TBUT): OS_TBUT: INSTANT TBUT

## 2023-10-26 PROBLEM — G43.111 MIGRAINE WITH AURA, INTRACTABLE, WITH STATUS MIGRAINOSUS: Status: ACTIVE | Noted: 2023-10-25

## 2023-10-26 PROBLEM — H16.223 KERATOCONJUNCTIVITIS SICCA ; LEFT EYE: Status: ACTIVE | Noted: 2023-10-25

## 2023-10-26 PROBLEM — H53.15 VISUAL DISTORTIONS: Status: ACTIVE | Noted: 2023-10-25

## 2023-10-26 ASSESSMENT — KERATOMETRY
OS_AXISANGLE_DEGREES: 125
OD_AXISANGLE_DEGREES: 040
OD_K2POWER_DIOPTERS: 44.75
OS_K2POWER_DIOPTERS: 44.75
OD_K1POWER_DIOPTERS: 44.25
OS_K1POWER_DIOPTERS: 44.25

## 2023-10-26 ASSESSMENT — REFRACTION_MANIFEST
OS_CYLINDER: +0.50
OD_AXIS: 027
OD_SPHERE: -0.50
OS_VA1: 20/20
OS_AXIS: 175
OD_SPHERE: -0.50
OD_VA2: 20/20
OS_SPHERE: -0.50
OD_CYLINDER: +0.50
OS_AXIS: 160
OS_VA2: 20/20
OS_VA1: 20/20
OD_VA1: 20/20
OU_VA: 20/15
OD_VA1: 20/20
OD_CYLINDER: +0.75
OD_AXIS: 010
OS_CYLINDER: +0.50
OS_SPHERE: -0.50

## 2023-10-26 ASSESSMENT — SPHEQUIV_DERIVED
OS_SPHEQUIV: -0.25
OD_SPHEQUIV: -0.25
OD_SPHEQUIV: -0.125
OD_SPHEQUIV: 0

## 2023-10-26 ASSESSMENT — REFRACTION_AUTOREFRACTION
OD_AXIS: 020
OD_SPHERE: -0.25
OS_AXIS: 160
OS_CYLINDER: +0.50
OS_SPHERE: -0.50
OD_CYLINDER: +0.50

## 2023-10-26 ASSESSMENT — AXIALLENGTH_DERIVED
OS_AL: 23.325
OD_AL: 23.325
OS_AL: 23.325
OD_AL: 23.2302
OS_AL: 23.325
OD_AL: 23.2775

## 2023-10-26 ASSESSMENT — VISUAL ACUITY
OD_BCVA: 20/30-1
OS_BCVA: 20/30

## 2024-02-08 ENCOUNTER — DOCTOR'S OFFICE (OUTPATIENT)
Dept: URBAN - METROPOLITAN AREA CLINIC 157 | Facility: CLINIC | Age: 29
Setting detail: OPHTHALMOLOGY
End: 2024-02-08
Payer: OTHER GOVERNMENT

## 2024-02-08 DIAGNOSIS — H04.123: ICD-10-CM

## 2024-02-08 DIAGNOSIS — H53.15: ICD-10-CM

## 2024-02-08 DIAGNOSIS — H52.13: ICD-10-CM

## 2024-02-08 DIAGNOSIS — G43.111: ICD-10-CM

## 2024-02-08 PROCEDURE — 92012 INTRM OPH EXAM EST PATIENT: CPT | Performed by: OPHTHALMOLOGY

## 2024-02-08 ASSESSMENT — REFRACTION_MANIFEST
OD_VA1: 20/20
OD_VA1: 20/20
OS_VA1: 20/20
OD_AXIS: 027
OS_VA2: 20/20
OS_VA1: 20/20
OS_SPHERE: -0.50
OD_CYLINDER: +0.50
OS_CYLINDER: +0.50
OU_VA: 20/15
OS_CYLINDER: +0.50
OD_SPHERE: -0.50
OD_VA2: 20/20
OS_AXIS: 175
OD_SPHERE: -0.50
OD_AXIS: 010
OS_SPHERE: -0.50
OD_CYLINDER: +0.75
OS_AXIS: 160

## 2024-02-08 ASSESSMENT — REFRACTION_AUTOREFRACTION
OD_CYLINDER: +0.75
OS_AXIS: 170
OD_SPHERE: -0.50
OS_SPHERE: -0.75
OS_CYLINDER: +0.75
OD_AXIS: 010

## 2024-02-08 ASSESSMENT — SPHEQUIV_DERIVED
OD_SPHEQUIV: -0.25
OD_SPHEQUIV: -0.125
OS_SPHEQUIV: -0.25
OD_SPHEQUIV: -0.125
OS_SPHEQUIV: -0.25
OS_SPHEQUIV: -0.375

## 2024-02-08 ASSESSMENT — CONFRONTATIONAL VISUAL FIELD TEST (CVF)
OS_FINDINGS: FULL
OD_FINDINGS: FULL

## 2024-02-08 ASSESSMENT — TEAR BREAK UP TIME (TBUT)
OS_TBUT: 1+
OD_TBUT: 1+